# Patient Record
Sex: FEMALE | Race: OTHER | HISPANIC OR LATINO | URBAN - METROPOLITAN AREA
[De-identification: names, ages, dates, MRNs, and addresses within clinical notes are randomized per-mention and may not be internally consistent; named-entity substitution may affect disease eponyms.]

---

## 2023-01-04 PROBLEM — Z00.00 ENCOUNTER FOR PREVENTIVE HEALTH EXAMINATION: Status: ACTIVE | Noted: 2023-01-04

## 2023-01-09 ENCOUNTER — OUTPATIENT (OUTPATIENT)
Dept: OUTPATIENT SERVICES | Facility: HOSPITAL | Age: 68
LOS: 1 days | End: 2023-01-09

## 2023-01-09 ENCOUNTER — RESULT REVIEW (OUTPATIENT)
Age: 68
End: 2023-01-09

## 2023-01-09 ENCOUNTER — APPOINTMENT (OUTPATIENT)
Dept: OBGYN | Facility: HOSPITAL | Age: 68
End: 2023-01-09
Payer: MEDICAID

## 2023-01-09 VITALS
HEART RATE: 72 BPM | TEMPERATURE: 97.9 F | WEIGHT: 147 LBS | BODY MASS INDEX: 30.86 KG/M2 | HEIGHT: 58 IN | DIASTOLIC BLOOD PRESSURE: 82 MMHG | SYSTOLIC BLOOD PRESSURE: 135 MMHG

## 2023-01-09 PROCEDURE — 99213 OFFICE O/P EST LOW 20 MIN: CPT | Mod: GE

## 2023-01-10 LAB — HPV HIGH+LOW RISK DNA PNL CVX: SIGNIFICANT CHANGE UP

## 2023-01-12 DIAGNOSIS — Z01.419 ENCOUNTER FOR GYNECOLOGICAL EXAMINATION (GENERAL) (ROUTINE) WITHOUT ABNORMAL FINDINGS: ICD-10-CM

## 2023-01-12 DIAGNOSIS — Z00.00 ENCOUNTER FOR GENERAL ADULT MEDICAL EXAMINATION WITHOUT ABNORMAL FINDINGS: ICD-10-CM

## 2023-01-12 DIAGNOSIS — Z76.89 PERSONS ENCOUNTERING HEALTH SERVICES IN OTHER SPECIFIED CIRCUMSTANCES: ICD-10-CM

## 2023-01-13 LAB — CYTOLOGY SPEC DOC CYTO: SIGNIFICANT CHANGE UP

## 2023-01-20 NOTE — HISTORY OF PRESENT ILLNESS
[FreeTextEntry1] : 66yo  postmenopausal (LMP 37 years ago) presents to establish care and for annual gynecologic exam. Patient is a recent immigrant from [facility] (2022). Reports lack of medical screening prior to immigrating. Patient also reports that her daughter frequently presents w/ her to medical appointments, however daughter was unable to come today. History is limited as a result. \par Denies fever, chills, CP/SOB, palpitations, N/V/D, constipation, urinary frequency/urgency, urinary odor, hematuria, vaginal discharge, vaginal odor, vaginal bleeding, vaginal dryness. \par \par OBHx: NSVDx7 (last child in ) \par \par GYNHx: \par   - Postmenopausal sx's: denies hot flashes, night sweats, vaginal dryness, vaginal itching, mood instability \par   - Sexual activity: has not been sexually active for over 2 years\par   - Pap smear: reports vague hx of normal pap smears x2 in [facility] \par   - Denies ovarian cyst, fibroids\par \par HCM:\par   - Mammogram: reports last screening mammogram 2-3 months ago which showed a small mass on the right breast. Reports she is s/p a biopsy. Pt reports she is returning next week for f/u. \par   - DEXA: reports last 3-4 months ago\par   - Colonoscopy: denies \par   - Vaccines: believes she is up to date \par \par PMHx: \par ?HLD - reports she was instructed to take fish oil in the past which she used for approximately 2 months before stopping. Pt believes her cholesterol was elevated at that time. \par \par

## 2023-01-20 NOTE — DISCUSSION/SUMMARY
[FreeTextEntry1] : A/P: 68yo  postmenopausal (LMP 37 years ago) woman who is a recent immigrant ([facility]) presents to establish care and for annual gynecologic exam. \par \par #Vaginal bleeding w/ manipulation likely d/t vaginal dryness \par - instructed pt to use OTC vaginal lubricants or coconut oil at night for 2 weeks and then 2 days a week. Provided instructions on how to apply for comfort. \par - d/t breast mass, vaginal estrogen was not offered during this visit \par \par #Breast Mass\par - pt to f/u within the next week\par - instructed to bring records during next visit\par \par #HCM\par - Mammogram: positive for mass s/p biopsy; f/u next visit \par - Pap: performed today \par - Colonoscopy: referral provided today \par - DEXA: up to date\par \par RTC in 2 weeks to f/u on records, discuss cervical CA screening results, and f/u vaginal dryness\par \par Patient discussed w/ Dr. Alonzo, Attending Physician \par \par Anna Mo, PGY1\par

## 2023-01-20 NOTE — PHYSICAL EXAM
[Chaperone Present] : A chaperone was present in the examining room during all aspects of the physical examination [Appropriately responsive] : appropriately responsive [Alert] : alert [No Acute Distress] : no acute distress [No Lymphadenopathy] : no lymphadenopathy [Regular Rate Rhythm] : regular rate rhythm [No Murmurs] : no murmurs [Clear to Auscultation B/L] : clear to auscultation bilaterally [Soft] : soft [Non-tender] : non-tender [Non-distended] : non-distended [No HSM] : No HSM [No Lesions] : no lesions [No Mass] : no mass [Oriented x3] : oriented x3 [No Masses] : no breast masses were palpable [Labia Majora] : normal [Labia Minora] : normal [Atrophy] : atrophy [Dry Mucosa] : dry mucosa [Normal] : normal [Uterine Adnexae] : non-palpable [FreeTextEntry4] : streak of scant bright red blood following speculum manipulation  [FreeTextEntry5] : several small gray patches present on the ectocervix

## 2023-01-20 NOTE — REASON FOR VISIT
[Annual] : an annual visit. [Interpreters_IDNumber] : [id number] [TWNoteComboBox1] : Citizen of [place]

## 2023-01-23 ENCOUNTER — OUTPATIENT (OUTPATIENT)
Dept: OUTPATIENT SERVICES | Facility: HOSPITAL | Age: 68
LOS: 1 days | End: 2023-01-23

## 2023-01-23 ENCOUNTER — RESULT REVIEW (OUTPATIENT)
Age: 68
End: 2023-01-23

## 2023-01-23 ENCOUNTER — APPOINTMENT (OUTPATIENT)
Dept: OBGYN | Facility: HOSPITAL | Age: 68
End: 2023-01-23
Payer: MEDICAID

## 2023-01-23 VITALS
BODY MASS INDEX: 31.28 KG/M2 | DIASTOLIC BLOOD PRESSURE: 72 MMHG | HEIGHT: 58 IN | WEIGHT: 149 LBS | HEART RATE: 71 BPM | SYSTOLIC BLOOD PRESSURE: 136 MMHG | TEMPERATURE: 98.2 F

## 2023-01-23 DIAGNOSIS — R30.0 DYSURIA: ICD-10-CM

## 2023-01-23 LAB
APPEARANCE UR: CLEAR — SIGNIFICANT CHANGE UP
BILIRUB UR-MCNC: NEGATIVE — SIGNIFICANT CHANGE UP
COLOR SPEC: COLORLESS — SIGNIFICANT CHANGE UP
DIFF PNL FLD: NEGATIVE — SIGNIFICANT CHANGE UP
EPI CELLS # UR: SIGNIFICANT CHANGE UP /HPF (ref 0–5)
GLUCOSE UR QL: NEGATIVE — SIGNIFICANT CHANGE UP
KETONES UR-MCNC: NEGATIVE — SIGNIFICANT CHANGE UP
LEUKOCYTE ESTERASE UR-ACNC: NEGATIVE — SIGNIFICANT CHANGE UP
NITRITE UR-MCNC: NEGATIVE — SIGNIFICANT CHANGE UP
PH UR: 7 — SIGNIFICANT CHANGE UP (ref 5–8)
PROT UR-MCNC: NEGATIVE — SIGNIFICANT CHANGE UP
RBC CASTS # UR COMP ASSIST: 1 /HPF — SIGNIFICANT CHANGE UP (ref 0–4)
SP GR SPEC: 1.01 — SIGNIFICANT CHANGE UP (ref 1.01–1.05)
UROBILINOGEN FLD QL: SIGNIFICANT CHANGE UP
WBC UR QL: 1 /HPF — SIGNIFICANT CHANGE UP (ref 0–5)

## 2023-01-23 PROCEDURE — 99213 OFFICE O/P EST LOW 20 MIN: CPT | Mod: GE

## 2023-01-24 DIAGNOSIS — N95.1 MENOPAUSAL AND FEMALE CLIMACTERIC STATES: ICD-10-CM

## 2023-01-25 LAB
CULTURE RESULTS: SIGNIFICANT CHANGE UP
SPECIMEN SOURCE: SIGNIFICANT CHANGE UP

## 2023-01-25 NOTE — REVIEW OF SYSTEMS
[Urgency] : urgency [Dysuria] : dysuria [Fever] : no fever [Chills] : no chills [Abn Vaginal bleeding] : no abnormal vaginal bleeding

## 2023-01-25 NOTE — PHYSICAL EXAM
[Appropriately responsive] : appropriately responsive [Alert] : alert [No Acute Distress] : no acute distress [Soft] : soft [Non-tender] : non-tender [FreeTextEntry7] : No CVA tenderness b/l

## 2023-01-25 NOTE — PLAN
[FreeTextEntry1] : 68yo P7, LMP 37yrs ago, who presents for follow-up of vaginal dryness with marked improvement of her symptoms with coconut oil. Had mentioned situation dysuria during evaluation\par \par #Vaginal dryness\par - Continue with coconut oil\par - Review of breast bx from 8/22/2022 indicated benign etiology, cystic duct ectasia. Recommended 6mo follow-up sonogram. This would be this February/March. Reiterated to patient to have this scheduled or to have her daughter schedule this\par - Discussed that if sx worsen, she can return to discuss vaginal Estrogen \par \par #HM\par - Discussed pap was normal\par - Recommended she follow-up with GI for colonoscopy \par \par Jordan Galeana, PGY-1\par d/w Dr. Smith \par \par RTC x1 year for annual or sooner if with any other concerns

## 2023-01-25 NOTE — HISTORY OF PRESENT ILLNESS
[FreeTextEntry1] :  # [id number]\par \par 68yo P7, LMP 37yrs ago, who presents for follow-up for annual for which she had complaints regarding vaginal dryness. Offered OTC lubricants and coconut oil given reported hx of a breast bx (brought results today). Reports marked improvement in her vaginal dryness w/ coconut oil. Denies any vaginal bleeding or discharge. Mentions 3 days of dysuria and urinary urgency when she wears tight clothing. No symptoms with loose fitting clothing. Denies any fevers or chills  \par \par